# Patient Record
Sex: FEMALE | Race: BLACK OR AFRICAN AMERICAN | Employment: OTHER | URBAN - METROPOLITAN AREA
[De-identification: names, ages, dates, MRNs, and addresses within clinical notes are randomized per-mention and may not be internally consistent; named-entity substitution may affect disease eponyms.]

---

## 2019-12-27 ENCOUNTER — HOSPITAL ENCOUNTER (OUTPATIENT)
Dept: MAMMOGRAPHY | Age: 64
Discharge: HOME | End: 2019-12-27
Attending: OBSTETRICS & GYNECOLOGY
Payer: MEDICARE

## 2019-12-27 DIAGNOSIS — Z78.0 POST-MENOPAUSAL: ICD-10-CM

## 2019-12-27 DIAGNOSIS — Z13.820 SCREENING FOR OSTEOPOROSIS: ICD-10-CM

## 2019-12-27 PROCEDURE — 77080 DXA BONE DENSITY AXIAL: CPT

## 2022-07-10 ENCOUNTER — HOSPITAL ENCOUNTER (EMERGENCY)
Age: 67
Discharge: HOME | End: 2022-07-10
Attending: EMERGENCY MEDICINE
Payer: MEDICARE

## 2022-07-10 VITALS
SYSTOLIC BLOOD PRESSURE: 155 MMHG | HEIGHT: 67 IN | HEART RATE: 58 BPM | WEIGHT: 186 LBS | RESPIRATION RATE: 16 BRPM | TEMPERATURE: 98.9 F | BODY MASS INDEX: 29.19 KG/M2 | DIASTOLIC BLOOD PRESSURE: 98 MMHG | OXYGEN SATURATION: 98 %

## 2022-07-10 DIAGNOSIS — I10 HYPERTENSION, UNSPECIFIED TYPE: Primary | ICD-10-CM

## 2022-07-10 PROCEDURE — 99284 EMERGENCY DEPT VISIT MOD MDM: CPT

## 2022-07-10 RX ORDER — ASPIRIN 81 MG/1
81 TABLET ORAL DAILY
COMMUNITY

## 2022-07-10 RX ORDER — AMLODIPINE BESYLATE 5 MG/1
10 TABLET ORAL DAILY
COMMUNITY
Start: 2021-09-16

## 2022-07-10 RX ORDER — TERBINAFINE HYDROCHLORIDE 250 MG/1
250 TABLET ORAL DAILY
COMMUNITY
End: 2022-10-13

## 2022-07-10 RX ORDER — BISOPROLOL FUMARATE AND HYDROCHLOROTHIAZIDE 10; 6.25 MG/1; MG/1
1 TABLET ORAL DAILY
COMMUNITY
Start: 2022-03-01

## 2022-07-10 ASSESSMENT — PAIN - FUNCTIONAL ASSESSMENT
PAIN_FUNCTIONAL_ASSESSMENT: NONE - DENIES PAIN
PAIN_FUNCTIONAL_ASSESSMENT: NONE - DENIES PAIN

## 2022-07-10 ASSESSMENT — ENCOUNTER SYMPTOMS
NAUSEA: 0
COUGH: 0
SHORTNESS OF BREATH: 0
ABDOMINAL DISTENTION: 0
DIARRHEA: 0
VOMITING: 0
EYE REDNESS: 0
SORE THROAT: 0
RHINORRHEA: 0
CHEST TIGHTNESS: 0
BACK PAIN: 0

## 2022-07-10 NOTE — ED PROVIDER NOTES
[facility] Provider Note                   PCP:                NOT ON FILE               Age: 69 y.o. Sex: female       ICD-10-CM    1. Hypertension, unspecified type  I10        DISPOSITION Decision To Discharge 07/10/2022 08:16:25 PM       MDM  Number of Diagnoses or Management Options  Hypertension, unspecified type  Diagnosis management comments: Patient is a 47-year-old female with history of hypertension who came in after having elevated blood pressure reading at home. She is asymptomatic and denies any headache, visual changes, chest pain, shortness of breath. She took her normal morning medications as usual.  Blood pressure upon initial evaluation 194/100 mmHg. Patient was monitored for over an hour with blood pressure improving to 167/92 mmHg. She continues to be asymptomatic at this time. Will DC home and advised patient to schedule close follow-up appointment with her doctor for further evaluation. Patient also instructed to avoid foods high in sodium as well as to keep a blood pressure record taking it 3 times daily same time each day over the next couple of days to take with her to the doctor for further evaluation. Discussed reasons to return to the ER. Patient verbalizes understanding and is agreeable to plan. No orders of the defined types were placed in this encounter. Justice Hutchison is a 69 y.o. female who presents to the Emergency Department with chief complaint of    Chief Complaint   Patient presents with    Hypertension      Patient is a 47-year-old female with history of hypertension who presents with complaint of elevated blood pressure. She states that she takes 5 mg of Norvasc as well as 10 mg - 6.25 mg of bisoprolol/hydrochlorothiazide daily. She takes them every morning when she takes an 81 mg of aspirin and did not miss any medications today.   She notes that this evening she had a dull headache and took some Tylenol which seemed to resolve the pain in her head. At the time she thought she had better check her blood pressure because of the headache and because her doctor told her that she should be checking it intermittently. When she took her blood pressure at home she found it to be elevated at 170/90 7 mmHg. She was concerned because this is much higher than where she normally runs. Because of this she came to the ER for an evaluation. She states she does not have a headache currently, she does not have any visual changes, she denies any chest pain, shortness of breath, leg swelling. She states overall that she feels \"great\". The history is provided by the patient. All other systems reviewed and are negative. Review of Systems   Constitutional: Negative for activity change, appetite change, chills, fatigue and fever. HENT: Negative for congestion, ear pain, rhinorrhea and sore throat. Eyes: Negative for redness. Respiratory: Negative for cough, chest tightness and shortness of breath. Cardiovascular: Negative for chest pain. Gastrointestinal: Negative for abdominal distention, diarrhea, nausea and vomiting. Genitourinary: Negative for flank pain. Musculoskeletal: Negative for back pain and neck pain. Skin: Negative for rash. Neurological: Negative for dizziness, light-headedness and headaches. Psychiatric/Behavioral: Negative for confusion.        Past Medical History:   Diagnosis Date    Colon polyp     Hypertension     Vaginal delivery     x2        Past Surgical History:   Procedure Laterality Date    COLONOSCOPY  2017    Repeat in 5 years- WNL    HYSTERECTOMY, TOTAL ABDOMINAL (CERVIX REMOVED)      Has both ovaries         Family History   Problem Relation Age of Onset    No Known Problems Paternal Grandfather     Cancer Paternal Grandmother         Type unknown    No Known Problems Maternal Grandfather     Stroke Maternal Grandmother     Hypertension Father     Diabetes Mother    Sam Charles may be present. This note has not been completely proofread for errors.        Scout ALMANZAR  07/10/22 2037

## 2022-07-11 NOTE — ED NOTES
I have reviewed discharge instructions with the patient. The patient verbalized understanding. Patient left ED via Discharge Method: ambulatory to Home with spouse. Opportunity for questions and clarification provided. Patient given 0 scripts. To continue your aftercare when you leave the hospital, you may receive an automated call from our care team to check in on how you are doing. This is a free service and part of our promise to provide the best care and service to meet your aftercare needs.  If you have questions, or wish to unsubscribe from this service please call [phone]. Thank you for Choosing our [facility] Emergency Department.       Alba Guzman RN  07/10/22 5571

## 2022-10-12 NOTE — PROGRESS NOTES
HPI:  [place] [facility] [place] [facility] [place] a 69 y.o.   OB History          2    Para   2    Term   2            AB        Living   2         SAB        IAB        Ectopic        Molar        Multiple        Live Births   2             who is here today for a well woman exam.      Date Performed Result   PAP 20 Negative HPV not performed   Mammogram 22 Benign- Adriana   Colonoscopy 2017 WNL   Dexa 19 Normal     GYN History         No LMP recorded. Patient has had a hysterectomy. Past Medical History:  Past Medical History:   Diagnosis Date    Colon polyp     Hypertension     Vaginal delivery     x2       Past Surgical History:  Past Surgical History:   Procedure Laterality Date    COLONOSCOPY  2017    Repeat in 5 years- WNL    HYSTERECTOMY, TOTAL ABDOMINAL (CERVIX REMOVED)      Has both ovaries        Allergies: Allergies   Allergen Reactions    Penicillins Itching     Other reaction(s): Itching-Allergy  Yeast infection  Yeast infection         Medication History:  Current Outpatient Medications   Medication Sig Dispense Refill    amLODIPine (NORVASC) 5 MG tablet Take 10 mg by mouth daily      bisoprolol-hydroCHLOROthiazide (ZIAC) 10-6.25 MG per tablet Take 1 tablet by mouth daily      vitamin D 25 MCG (1000 UT) CAPS Take by mouth daily      aspirin 81 MG EC tablet Take 81 mg by mouth daily       No current facility-administered medications for this visit.        Social History:  Social History     Socioeconomic History    Marital status: Unknown     Spouse name: Not on file    Number of children: Not on file    Years of education: Not on file    Highest education level: Not on file   Occupational History    Not on file   Tobacco Use    Smoking status: Never    Smokeless tobacco: Never   Vaping Use    Vaping Use: Never used   Substance and Sexual Activity    Alcohol use: Yes    Drug use: No    Sexual activity: Not on file     Comment: Hyst   Other Topics Concern    Not on file   Social History Narrative Not on file     Social Determinants of Health     Financial Resource Strain: Not on file   Food Insecurity: Not on file   Transportation Needs: Not on file   Physical Activity: Not on file   Stress: Not on file   Social Connections: Not on file   Intimate Partner Violence: Not on file   Housing Stability: Not on file       Family History:  Family History   Problem Relation Age of Onset    No Known Problems Paternal Grandfather     Cancer Paternal Grandmother         Type unknown    No Known Problems Maternal Grandfather     Stroke Maternal Grandmother     Hypertension Father     Diabetes Mother     Hypertension Mother        Review of Systems - General ROS: negative except for that discussed in HPI      ROS:  Feeling well. No dyspnea or chest pain on exertion. No abdominal pain, change in bowel habits, black or bloody stools. No urinary tract symptoms. No neurological complaints. Objective:   Ht 5' 7\" (1.702 m)   Wt 181 lb 6.4 oz (82.3 kg)   BMI 28.41 kg/m²     No results found for any visits on 10/13/22. The patient appears well, alert, oriented x 3, in no distress. ENT normal.  Neck supple. No adenopathy or thyromegaly. Lungs:  clear, good air entry, no wheezes, rhonchi or rales. Heart:  S1 and S2 normal, no murmurs, regular rate and rhythm. Abdomen:  soft without tenderness, guarding, mass or organomegaly. Extremities show no edema, normal peripheral pulses. Neurological is normal, no focal findings. BREAST EXAM: breasts appear normal, no suspicious masses, no skin or nipple changes or axillary nodes, symmetric fibrous changes bilaterally    PELVIC EXAM: External genitalia is within normal limits, urethra, urethra meatus and bladder are midline well supported. Vagina is atrophic Cervix absent, pap not due, ok cuff. Uterus is absent,  no ovarian masses palpated    Assessment/Plan:      Diagnosis Orders   1. Well woman exam          Encounter Diagnoses   Name Primary?     Well woman exam Yes No orders of the defined types were placed in this encounter.    Velma was seen today for annual exam.    Diagnoses and all orders for this visit:    Well woman exam  -     Cancel: PAP IG, Liquid-Based Rfx Aptima HPV when ASC-U, ASC-H, LSIL, HSIL, KAREN; Future      Return in about 1 year (around 10/13/2023) for yearly physical.  current treatment plan is effective, no change in therapy    mammogram  pap smear not done  return annually or prn

## 2022-10-13 ENCOUNTER — OFFICE VISIT (OUTPATIENT)
Dept: OBGYN CLINIC | Age: 67
End: 2022-10-13
Payer: MEDICARE

## 2022-10-13 VITALS
HEIGHT: 67 IN | BODY MASS INDEX: 28.47 KG/M2 | DIASTOLIC BLOOD PRESSURE: 92 MMHG | WEIGHT: 181.4 LBS | SYSTOLIC BLOOD PRESSURE: 128 MMHG

## 2022-10-13 DIAGNOSIS — Z01.419 WELL WOMAN EXAM: Primary | ICD-10-CM

## 2022-10-13 PROCEDURE — G0101 CA SCREEN;PELVIC/BREAST EXAM: HCPCS | Performed by: OBSTETRICS & GYNECOLOGY

## 2022-12-03 ENCOUNTER — APPOINTMENT (OUTPATIENT)
Dept: GENERAL RADIOLOGY | Age: 67
End: 2022-12-03
Payer: MEDICARE

## 2022-12-03 ENCOUNTER — HOSPITAL ENCOUNTER (EMERGENCY)
Age: 67
Discharge: HOME | End: 2022-12-03
Attending: EMERGENCY MEDICINE
Payer: MEDICARE

## 2022-12-03 VITALS
DIASTOLIC BLOOD PRESSURE: 90 MMHG | SYSTOLIC BLOOD PRESSURE: 158 MMHG | HEIGHT: 67 IN | TEMPERATURE: 98.6 F | HEART RATE: 67 BPM | OXYGEN SATURATION: 98 % | WEIGHT: 180 LBS | RESPIRATION RATE: 18 BRPM | BODY MASS INDEX: 28.25 KG/M2

## 2022-12-03 DIAGNOSIS — M75.31 CALCIFIC TENDINITIS OF RIGHT SHOULDER: Primary | ICD-10-CM

## 2022-12-03 PROCEDURE — 96372 THER/PROPH/DIAG INJ SC/IM: CPT | Performed by: EMERGENCY MEDICINE

## 2022-12-03 PROCEDURE — 73030 X-RAY EXAM OF SHOULDER: CPT

## 2022-12-03 PROCEDURE — 99284 EMERGENCY DEPT VISIT MOD MDM: CPT | Performed by: EMERGENCY MEDICINE

## 2022-12-03 PROCEDURE — 6360000002 HC RX W HCPCS: Performed by: EMERGENCY MEDICINE

## 2022-12-03 RX ORDER — KETOROLAC TROMETHAMINE 15 MG/ML
15 INJECTION, SOLUTION INTRAMUSCULAR; INTRAVENOUS
Status: COMPLETED | OUTPATIENT
Start: 2022-12-03 | End: 2022-12-03

## 2022-12-03 RX ADMIN — KETOROLAC TROMETHAMINE 15 MG: 15 INJECTION, SOLUTION INTRAMUSCULAR; INTRAVENOUS at 09:33

## 2022-12-03 ASSESSMENT — PAIN - FUNCTIONAL ASSESSMENT: PAIN_FUNCTIONAL_ASSESSMENT: 0-10

## 2022-12-03 ASSESSMENT — PAIN DESCRIPTION - LOCATION
LOCATION: SHOULDER
LOCATION: SHOULDER

## 2022-12-03 ASSESSMENT — ENCOUNTER SYMPTOMS
SHORTNESS OF BREATH: 0
NAUSEA: 0
VOMITING: 0
COUGH: 0
BACK PAIN: 0
COLOR CHANGE: 0

## 2022-12-03 ASSESSMENT — PAIN DESCRIPTION - ORIENTATION
ORIENTATION: RIGHT
ORIENTATION: RIGHT

## 2022-12-03 ASSESSMENT — PAIN SCALES - GENERAL
PAINLEVEL_OUTOF10: 6
PAINLEVEL_OUTOF10: 7

## 2022-12-03 NOTE — ED TRIAGE NOTES
Pt states she was reaching  from front seat to back seat of car with right arm and felt something pull in right shoulder. Hurts in right shoulder to bend and lift.

## 2022-12-03 NOTE — LETTER
[facility] EMERGENCY DEPT  [address] [address]  Phone: [phone]               December 3, 2022    Patient: Jared Garg   YOB: 1955   Date of Visit: 12/3/2022       To Whom It May Concern:    Jared Garg was seen and treated in our emergency department on 12/3/2022. She may return to work on 12/6/2022 provided following up with orthopedics.       Sincerely,       Jodi Bass RN         Signature:__________________________________

## 2022-12-03 NOTE — DISCHARGE INSTRUCTIONS
Continue Aleve twice daily for pain. Tylenol 500 to 650 mg every 6 hours for breakthrough pain. Ice your shoulder for 15 minutes every 4 hours while awake for 5 to 7 days. After 3 to 5 days you may begin some stretching exercises as outlined in the discharge instructions. Follow-up with orthopedics when called with appointment time. Call them Tuesday or Wednesday if you have not heard from them. Activity as tolerated but no lifting more than 5 to 10 pounds with your right arm for 5 to 7 days.

## 2022-12-03 NOTE — ED PROVIDER NOTES
Emergency Department Provider Note                   PCP:                Kin Torres MD               Age: 69 y.o. Sex: female       ICD-10-CM    1. Calcific tendinitis of right shoulder  M75.31 [facility]          DISPOSITION Decision To Discharge 12/03/2022 09:38:43 AM        MDM  Number of Diagnoses or Management Options  Calcific tendinitis of right shoulder  Diagnosis management comments: Will obtain x-ray imaging to exclude bony injury. Suspect muscle strain or rotator cuff injury. Follow-up with orthopedics and RICE therapy initially will be recommended. Patient may end up needing physical therapy and MRI and follow-up. Amount and/or Complexity of Data Reviewed  Tests in the radiology section of CPT®: ordered and reviewed    Risk of Complications, Morbidity, and/or Mortality  Presenting problems: low  Diagnostic procedures: low  Management options: low    Patient Progress  Patient progress: stable             Orders Placed This Encounter   Procedures    XR SHOULDER RIGHT (MIN 2 VIEWS)    [facility]        Medications   ketorolac (TORADOL) injection 15 mg (15 mg IntraMUSCular Given 12/3/22 0933)       New Prescriptions    No medications on file        Horace Quarles is a 69 y.o. female who presents to the Emergency Department with chief complaint of    Chief Complaint   Patient presents with    Shoulder Pain      68-year-old female complains of sharp right shoulder pain onset Thursday while reaching into the backseat after her purse. She slightly twisted her body and reached back with her shoulder and feels like she did not turn her body enough. She felt a pop and injury at that time and has had increasing pain and soreness since. Patient has decreased range of motion secondary to pain. She denies any fever, numbness tingling or weakness. Patient denies prior history of shoulder surgery or injury on this side.     The history is provided by the patient. Review of Systems   Constitutional:  Negative for chills and fever. Respiratory:  Negative for cough and shortness of breath. Cardiovascular:  Negative for chest pain. Gastrointestinal:  Negative for nausea and vomiting. Musculoskeletal:  Negative for back pain and neck pain. Skin:  Negative for color change and wound. Neurological:  Negative for weakness and numbness. Past Medical History:   Diagnosis Date    Colon polyp     Hypertension     Vaginal delivery     x2        Past Surgical History:   Procedure Laterality Date    COLONOSCOPY  2017    Repeat in 5 years- WNL    HYSTERECTOMY, TOTAL ABDOMINAL (CERVIX REMOVED)      Has both ovaries         Family History   Problem Relation Age of Onset    No Known Problems Paternal Grandfather     Cancer Paternal Grandmother         Type unknown    No Known Problems Maternal Grandfather     Stroke Maternal Grandmother     Hypertension Father     Diabetes Mother     Hypertension Mother         Social History     Socioeconomic History    Marital status: Unknown     Spouse name: None    Number of children: None    Years of education: None    Highest education level: None   Tobacco Use    Smoking status: Never    Smokeless tobacco: Never   Vaping Use    Vaping Use: Never used   Substance and Sexual Activity    Alcohol use: Yes    Drug use: No         Penicillins     Previous Medications    AMLODIPINE (NORVASC) 5 MG TABLET    Take 10 mg by mouth daily    ASPIRIN 81 MG EC TABLET    Take 81 mg by mouth daily    BISOPROLOL-HYDROCHLOROTHIAZIDE (ZIAC) 10-6.25 MG PER TABLET    Take 1 tablet by mouth daily    VITAMIN D 25 MCG (1000 UT) CAPS    Take by mouth daily        Vitals signs and nursing note reviewed. Patient Vitals for the past 4 hrs:   Temp Pulse Resp BP SpO2   12/03/22 0900 98.6 °F (37 °C) 67 18 (!) 158/90 98 %          Physical Exam  Vitals and nursing note reviewed.    Constitutional:       General: She is not in acute distress. Appearance: She is not ill-appearing. HENT:      Head: Normocephalic and atraumatic. Cardiovascular:      Rate and Rhythm: Normal rate. Heart sounds: Normal heart sounds. Pulmonary:      Effort: Pulmonary effort is normal.      Breath sounds: Normal breath sounds. Musculoskeletal:      Right shoulder: Tenderness present. No swelling, deformity, bony tenderness or crepitus. Decreased range of motion. Normal strength. Normal pulse. Arms:       Cervical back: Neck supple. No tenderness. Comments: Pain with abduction but not so much with external or internal rotation. Pain with flexion and extension present. Range of motion limited by pain. Neurovascularly intact with median, radial, ulnar and axillary nerves intact and 2+ radial pulse present   Lymphadenopathy:      Cervical: No cervical adenopathy. Neurological:      Mental Status: She is alert. Procedures    Results for orders placed or performed during the hospital encounter of 12/03/22   XR SHOULDER RIGHT (MIN 2 VIEWS)    Narrative    Right shoulder    CLINICAL INDICATION: Right shoulder pain after reaching injury    FINDINGS: Three views the right shoulder show a small amount of mineralization  over the distal rotator cuff region most in keeping with calcific tendinitis. There is no fracture. The joint spaces are maintained. Impression    Calcific tendinitis of the rotator cuff suspected. XR SHOULDER RIGHT (MIN 2 VIEWS)   Final Result   Calcific tendinitis of the rotator cuff suspected. Voice dictation software was used during the making of this note. This software is not perfect and grammatical and other typographical errors may be present. This note has not been completely proofread for errors.      April Tijerina MD  12/03/22 [id number]

## 2022-12-05 ENCOUNTER — OFFICE VISIT (OUTPATIENT)
Dept: ORTHOPEDIC SURGERY | Age: 67
End: 2022-12-05

## 2022-12-05 DIAGNOSIS — M75.31 CALCIFIC TENDINITIS OF RIGHT SHOULDER: Primary | ICD-10-CM

## 2022-12-05 NOTE — PROGRESS NOTES
Name: Lolita Lindsay  YOB: 1955  Gender: female  MRN: [id number]  Date of Encounter:  12/6/2022       CHIEF COMPLAINT:     Chief Complaint   Patient presents with    Shoulder Pain     Right        SUBJECTIVE/OBJECTIVE:      HPI:    Patient is a 69 y.o. pleasant female who presents today for a new evaluation of her right shoulder. She reports lateral right shoulder pain that started last week. She recalls reaching back behind her body to grab her purse and felt some pain there, but the pain got worse around 3 days later. She went to the emergency department due to the severity of the pain and was given an IM steroid injection left shoulder and now she has very little pain. She reports some mild soreness in the shoulder. Her range of motion has improved. She denies any weakness. She has no neck pain or numbness. She initially took Aleve and Tylenol, but has not required any further medication due to pain relief. She has no prior injury in the shoulder. PAST HISTORY:   Past medical, surgical, family, social history and allergies reviewed by me. Pertinent history:   Tobacco use:  reports that she has never smoked. She has never used smokeless tobacco.  Diabetes: none  CKD: no  Anticoagulation: no      REVIEW OF SYSTEMS:   As noted in HPI. PHYSICAL EXAMINATION:     Gen: Well-developed, no acute distress   HEENT: NC/AT, EOMI   Neck: Trachea midline, normal ROM   CV: Regular rhythm by palpation of distal pulse, normal capillary refill   Pulm: No respiratory distress, no stridor   Psychiatric: Well oriented, normal mood and affect. Skin: No rashes, lesions or ulcers, normal temperature, turgor, and texture on uninvolved extremity.       ORTHO EXAM:    Right Shoulder:     Inspection: No edema, No erythema, no atrophy  ROM: Active forward flexion 170, abduction 160, Internal rotation L2, External rotation 45  Tenderness: No tenderness  Strength: Abduction 5/5, External rotation 5/5, Internal rotation 5/5  Provocative tests: Mild discomfort with empty can and Maeir  Normal capillary refill / 2+ radial pulse   Sensation intact to light touch        DIAGNOSTIC IMAGING:     X-ray 3 vw RIGHT shoulder AP external / AP internal rotation / scapular Y taken previously    Findings: Increased signal to rotator cuff insertion likely cacific tendonitis. The glenohumeral joint appears normal with no significant degenerative changes. AC joint shows no signs of arthritis. There is no effusion. Normal alignment, no acute fracture  Impression: No significant degenerative changes, calcific tendinitis of rotator cuff    I independently interpreted XR taken today    ASSESSMENT/PLAN:   1. Calcific tendinitis of right shoulder         She is now pain-free after IM steroid injection. She has mild signs of rotator cuff tendinitis, but no weakness on exam.    I advised that she continue improvement of range of motion and she was provided a home exercise program.  She was advised that if she has return of her shoulder pain, she can come back to the office for consideration of a subacromial injection. OTC meds discussed as needed for pain. Return as needed    Orders / medications today: No orders of the defined types were placed in this encounter. Follow up: Return if symptoms worsen or fail to improve. The patient expressed understanding and agreed with the plan. Maximiliano Todd MD   [facility]     This document was created using voice recognition software so frequent mistakes are possible. For any concerns about the wording of this document, please contact its creator for further clarification.

## 2025-08-16 ENCOUNTER — HOSPITAL ENCOUNTER (EMERGENCY)
Age: 70
Discharge: HOME | End: 2025-08-16
Payer: MEDICARE

## 2025-08-16 ENCOUNTER — APPOINTMENT (OUTPATIENT)
Dept: GENERAL RADIOLOGY | Age: 70
End: 2025-08-16
Payer: MEDICARE

## 2025-08-16 ENCOUNTER — APPOINTMENT (OUTPATIENT)
Dept: ULTRASOUND IMAGING | Age: 70
End: 2025-08-16
Payer: MEDICARE

## 2025-08-16 VITALS
HEART RATE: 67 BPM | OXYGEN SATURATION: 98 % | BODY MASS INDEX: 26.94 KG/M2 | WEIGHT: 172 LBS | TEMPERATURE: 98.4 F | SYSTOLIC BLOOD PRESSURE: 168 MMHG | RESPIRATION RATE: 18 BRPM | DIASTOLIC BLOOD PRESSURE: 113 MMHG

## 2025-08-16 DIAGNOSIS — M79.605 LEFT LEG PAIN: ICD-10-CM

## 2025-08-16 DIAGNOSIS — M51.360 DEGENERATION OF INTERVERTEBRAL DISC OF LUMBAR REGION WITH DISCOGENIC BACK PAIN: Primary | ICD-10-CM

## 2025-08-16 PROCEDURE — 93971 EXTREMITY STUDY: CPT

## 2025-08-16 PROCEDURE — 99284 EMERGENCY DEPT VISIT MOD MDM: CPT

## 2025-08-16 PROCEDURE — 72100 X-RAY EXAM L-S SPINE 2/3 VWS: CPT

## 2025-08-16 PROCEDURE — 73552 X-RAY EXAM OF FEMUR 2/>: CPT

## 2025-08-16 RX ORDER — METHOCARBAMOL 500 MG/1
500 TABLET, FILM COATED ORAL 3 TIMES DAILY PRN
Qty: 30 TABLET | Refills: 0 | Status: SHIPPED | OUTPATIENT
Start: 2025-08-16 | End: 2025-08-26

## 2025-08-16 RX ORDER — METHYLPREDNISOLONE 4 MG/1
TABLET ORAL
Qty: 1 KIT | Refills: 0 | Status: SHIPPED | OUTPATIENT
Start: 2025-08-16 | End: 2025-08-22

## 2025-08-16 ASSESSMENT — PAIN DESCRIPTION - PAIN TYPE: TYPE: ACUTE PAIN

## 2025-08-16 ASSESSMENT — PAIN DESCRIPTION - ORIENTATION: ORIENTATION: LEFT

## 2025-08-16 ASSESSMENT — PAIN SCALES - GENERAL: PAINLEVEL_OUTOF10: 6

## 2025-08-16 ASSESSMENT — PAIN DESCRIPTION - LOCATION: LOCATION: LEG

## 2025-08-16 ASSESSMENT — PAIN - FUNCTIONAL ASSESSMENT: PAIN_FUNCTIONAL_ASSESSMENT: 0-10
